# Patient Record
Sex: MALE | Race: WHITE | Employment: OTHER | URBAN - METROPOLITAN AREA
[De-identification: names, ages, dates, MRNs, and addresses within clinical notes are randomized per-mention and may not be internally consistent; named-entity substitution may affect disease eponyms.]

---

## 2019-05-10 ENCOUNTER — OPTICAL OFFICE (OUTPATIENT)
Dept: URBAN - METROPOLITAN AREA CLINIC 146 | Facility: CLINIC | Age: 54
Setting detail: OPHTHALMOLOGY
End: 2019-05-10
Payer: COMMERCIAL

## 2019-05-10 DIAGNOSIS — H52.223: ICD-10-CM

## 2019-05-10 PROCEDURE — V2203 LENS SPHCYL BIFOCAL 4.00D/.1: HCPCS | Performed by: OPHTHALMOLOGY

## 2019-05-10 PROCEDURE — V2745 TINT, ANY COLOR/SOLID/GRAD: HCPCS | Performed by: OPHTHALMOLOGY

## 2019-05-10 PROCEDURE — V2750 ANTI-REFLECTIVE COATING: HCPCS | Performed by: OPHTHALMOLOGY

## 2019-05-10 PROCEDURE — V2020 VISION SVCS FRAMES PURCHASES: HCPCS | Performed by: OPHTHALMOLOGY

## 2019-05-10 PROCEDURE — V2025 EYEGLASSES DELUX FRAMES: HCPCS | Performed by: OPHTHALMOLOGY

## 2019-05-10 PROCEDURE — V2781 PROGRESSIVE LENS PER LENS: HCPCS | Performed by: OPHTHALMOLOGY

## 2019-05-10 PROCEDURE — V2799 MISC VISION ITEM OR SERVICE: HCPCS | Performed by: OPHTHALMOLOGY

## 2019-08-08 ENCOUNTER — OFFICE VISIT (OUTPATIENT)
Dept: URGENT CARE | Facility: CLINIC | Age: 54
End: 2019-08-08
Payer: COMMERCIAL

## 2019-08-08 VITALS
RESPIRATION RATE: 18 BRPM | HEIGHT: 66 IN | HEART RATE: 67 BPM | TEMPERATURE: 97.4 F | OXYGEN SATURATION: 99 % | DIASTOLIC BLOOD PRESSURE: 61 MMHG | BODY MASS INDEX: 22.02 KG/M2 | SYSTOLIC BLOOD PRESSURE: 107 MMHG | WEIGHT: 137 LBS

## 2019-08-08 DIAGNOSIS — L25.5 DERMATITIS DUE TO PLANTS, INCLUDING POISON IVY, SUMAC, AND OAK: Primary | ICD-10-CM

## 2019-08-08 PROCEDURE — 99213 OFFICE O/P EST LOW 20 MIN: CPT | Performed by: PHYSICIAN ASSISTANT

## 2019-08-08 RX ORDER — HYDROXYZINE HYDROCHLORIDE 25 MG/1
25 TABLET, FILM COATED ORAL EVERY 6 HOURS PRN
Qty: 30 TABLET | Refills: 0 | Status: SHIPPED | OUTPATIENT
Start: 2019-08-08

## 2019-08-08 RX ORDER — PREDNISONE 50 MG/1
50 TABLET ORAL DAILY
Qty: 5 TABLET | Refills: 0 | Status: SHIPPED | OUTPATIENT
Start: 2019-08-08 | End: 2019-08-13

## 2019-08-08 NOTE — PROGRESS NOTES
[address]    NAME: Henny Fernandez is a 53 y o  male  : 1965    MRN: [id number]  DATE: 2019  TIME: 3:48 PM    Assessment and Plan   Dermatitis due to plants, including poison ivy, sumac, and oak [L25 5]  1  Dermatitis due to plants, including poison ivy, sumac, and oak  predniSONE 50 mg tablet    hydrOXYzine HCL (ATARAX) 25 mg tablet       Patient Instructions   Allergic dermatitis due to poison ivy exposure  Prescription prednisone 50mg daily x 5 days  Recommend claritin/zyrtec twice daily x 1 week  Prescription atarax sent via EMR if claritin/zyrtec not working  [place], clothing and pets  Follow up with PCP in 3-5 days  Proceed to  ER if symptoms worsen  Chief Complaint     Chief Complaint   Patient presents with   [address]     started a few days ago on arms, hands, thighs  itchy  starting to weep  History of Present Illness     Star Mccoy is a 54 y/o male who presents to the clinic c/o pruritic rash on B/L wrists and upper thigh  He states that he was weeding and pulling emeterio 1 week ago when the rash started  He also notes that he gets rash from poison ivy annually  He has had enough, though and now hired  to weed  He laundered all clothes but not sheets and did not give dog a bath yet  Itching is keeping him up at night and is not improving  He is taking benadryl and OTC cortisone cream with little to no relief  Only mild weeping from lesions on wrists  He denies any SOB or respiratory involvement  Review of Systems   Review of Systems   Constitutional: Negative  Respiratory: Negative for shortness of breath  Skin: Positive for rash           Current Medications       Current Outpatient Medications:     hydrOXYzine HCL (ATARAX) 25 mg tablet, Take 1 tablet (25 mg total) by mouth every 6 (six) hours as needed for itching, Disp: 30 tablet, Rfl: 0    predniSONE 50 mg tablet, Take 1 tablet (50 mg total) by mouth daily for 5 days, Disp: 5 tablet, Rfl: 0    Current Allergies     Allergies as of 08/08/2019    (No Known Allergies)            The following portions of the patient's history were reviewed and updated as appropriate: allergies, current medications, past family history, past medical history, past social history, past surgical history and problem list      History reviewed  No pertinent past medical history  History reviewed  No pertinent surgical history  Family History   Problem Relation Age of Onset    Hypertension Mother     Diverticulitis Mother          Medications have been verified  Objective   /61   Pulse 67   Temp (!) 97 4 °F (36 3 °C)   Resp 18   Ht 5' 6" (1 676 m)   Wt 62 1 kg (137 lb)   SpO2 99%   BMI 22 11 kg/m²        Physical Exam     Physical Exam   Constitutional: He is oriented to person, place, and time  He appears well-developed and well-nourished  No distress  Neurological: He is alert and oriented to person, place, and time  Skin: Rash noted  Rash is vesicular  Erythematous vesicular rash on medial/dorsal aspect of B/L wrists  Minimal excoriation  No signs of infection, bleeding or weeping   Psychiatric: He has a normal mood and affect  Nursing note and vitals reviewed

## 2019-08-08 NOTE — PATIENT INSTRUCTIONS
Allergic dermatitis due to poison ivy exposure  Prescription prednisone 50mg daily x 5 days  Recommend claritin/zyrtec twice daily x 1 week  Prescription atarax sent via EMR if claritin/zyrtec not working  [place], clothing and pets  Follow up with PCP in 3-5 days  Proceed to  ER if symptoms worsen  Poison Ivy   WHAT YOU NEED TO KNOW:   Poison ivy is a plant that can cause an itchy, uncomfortable rash on your skin  Poison ivy grows as a shrub or vine in woods, fields, and areas of thick [place]  It has 3 bright green leaves on each stem that turn red in jeff  DISCHARGE INSTRUCTIONS:   Medicines:   · Antiseptic or drying creams or ointments: These medicines may be used to dry out the rash and decrease the itching  These products may be available without a doctor's order  · Steroids: This medicine helps decrease itching and inflammation  It can be given as a cream to apply to your skin or as a pill  · Antihistamines: This medicine may help decrease itching and help you sleep  It is available without a doctor's order  · Take your medicine as directed  Contact your healthcare provider if you think your medicine is not helping or if you have side effects  Tell him of her if you are allergic to any medicine  Keep a list of the medicines, vitamins, and herbs you take  Include the amounts, and when and why you take them  Bring the list or the pill bottles to follow-up visits  Carry your medicine list with you in case of an emergency  Follow up with your healthcare provider as directed:  Write down your questions so you remember to ask them during your visits  How your poison ivy rash spreads: You cannot spread poison ivy by touching your rash or the liquid from your blisters  Poison ivy is spread only if you scratch your skin while it still has oil on it  You may think your rash is spreading because new rashes appear over a number of days   This happens because areas covered by thin skin break out in a rash first  Your face or forearms may develop a rash before thicker areas, such as the palms of your hands  Self-care:   · Keep your rash clean and dry:  Wash it with soap and water  Gently pat it dry with a clean towel  · Try not to scratch or rub your rash: This can cause your skin to become infected  · Use a compress on your rash:  Dip a clean washcloth in cool water  Wring it out and place it on your rash  Leave the washcloth on your skin for 15 minutes  Do this at least 3 times per day  · Take a cornstarch or oatmeal bath: If your rash is too large to cover with wet washcloths, take 3 or 4 cornstarch baths daily  Mix 1 pound of cornstarch with a little water to make a paste  Add the paste to a tub full of water and mix well  You may also use colloidal oatmeal in the bath water  Use lukewarm water  Avoid hot water because it may cause your itching to increase  Prevent a poison ivy rash in the future:   · Wear skin protection:  Wear long pants, a long-sleeved shirt, and gloves  Use a skin block lotion to protect your skin from poison ivy oil  You can find this at a drugstore without a prescription  · Wash clothing after possible exposure: If you think you have been near a poison ivy plant, wash the clothes you were wearing separately from other clothes  Rinse the washing machine well after you take the clothes out  Scrub boots and shoes with warm, soapy water  Dry clean items and clothing that you cannot wash in water  Poison ivy oil is sticky and can stay on surfaces for a long time  It can cause a new rash even years later  · Bathe your pet:  Use warm water and shampoo on your pet's fur  This will prevent the spread of oil to your skin, car, and home  Wear long sleeves, long pants, and gloves while washing pets or any items that may have oil on them  · Reduce exposure to poison ivy:  Do not touch plants that look like poison ivy  Keep your yard free of poison ivy  While protecting your skin, remove the plant and the roots  Place them in a plastic bag and seal the bag tightly  · Do not burn poison ivy plants: This can spread the oil through the air  If you breathe the oil into your lungs, you could have swelling and serious breathing problems  Oil that clings to the fire domo can land on your skin and cause a rash  Contact your healthcare provider if:   · You have pus, soft yellow scabs, or tenderness on the rash  · The itching gets worse or keeps you awake at night  · The rash covers more than 1/4 of your skin or spreads to your eyes, mouth, or genital area  · The rash is not better after 2 to 3 weeks  · You have tender, swollen glands on the sides of your neck  · You have swelling in your arms and legs  · You have questions or concerns about your condition or care  Return to the emergency department if:   · You have a fever  · You have redness, swelling, and tenderness around the rash  · You have trouble breathing  © 2017 [facility] Information is for End User's use only and may not be sold, redistributed or otherwise used for commercial purposes  All illustrations and images included in CareNotes® are the copyrighted property of A D [employer] M [employer]  or [employer]  The above information is an  only  It is not intended as medical advice for individual conditions or treatments  Talk to your doctor, nurse or pharmacist before following any medical regimen to see if it is safe and effective for you

## 2019-08-14 ENCOUNTER — OPTICAL OFFICE (OUTPATIENT)
Dept: URBAN - METROPOLITAN AREA CLINIC 146 | Facility: CLINIC | Age: 54
Setting detail: OPHTHALMOLOGY
End: 2019-08-14
Payer: COMMERCIAL

## 2019-08-14 DIAGNOSIS — H52.223: ICD-10-CM

## 2019-08-14 PROCEDURE — V2781 PROGRESSIVE LENS PER LENS: HCPCS | Performed by: OPHTHALMOLOGY

## 2019-08-14 PROCEDURE — V2750 ANTI-REFLECTIVE COATING: HCPCS | Performed by: OPHTHALMOLOGY

## 2019-08-14 PROCEDURE — V2799 MISC VISION ITEM OR SERVICE: HCPCS | Performed by: OPHTHALMOLOGY

## 2019-08-14 PROCEDURE — V2203 LENS SPHCYL BIFOCAL 4.00D/.1: HCPCS | Performed by: OPHTHALMOLOGY

## 2019-08-14 PROCEDURE — V2744 TINT PHOTOCHROMATIC LENS/ES: HCPCS | Performed by: OPHTHALMOLOGY

## 2019-08-14 PROCEDURE — V2020 VISION SVCS FRAMES PURCHASES: HCPCS | Performed by: OPHTHALMOLOGY

## 2019-08-26 ENCOUNTER — OPTICAL OFFICE (OUTPATIENT)
Dept: URBAN - METROPOLITAN AREA CLINIC 146 | Facility: CLINIC | Age: 54
Setting detail: OPHTHALMOLOGY
End: 2019-08-26

## 2019-08-26 DIAGNOSIS — H52.7: ICD-10-CM

## 2019-08-26 PROCEDURE — V2799 MISC VISION ITEM OR SERVICE: HCPCS | Performed by: OPHTHALMOLOGY

## 2020-06-04 ENCOUNTER — DOCTOR'S OFFICE (OUTPATIENT)
Dept: URBAN - METROPOLITAN AREA CLINIC 137 | Facility: CLINIC | Age: 55
Setting detail: OPHTHALMOLOGY
End: 2020-06-04
Payer: COMMERCIAL

## 2020-06-04 ENCOUNTER — OPTICAL OFFICE (OUTPATIENT)
Dept: URBAN - METROPOLITAN AREA CLINIC 146 | Facility: CLINIC | Age: 55
Setting detail: OPHTHALMOLOGY
End: 2020-06-04
Payer: COMMERCIAL

## 2020-06-04 DIAGNOSIS — H52.03: ICD-10-CM

## 2020-06-04 DIAGNOSIS — H52.223: ICD-10-CM

## 2020-06-04 DIAGNOSIS — H52.4: ICD-10-CM

## 2020-06-04 PROCEDURE — V2020 VISION SVCS FRAMES PURCHASES: HCPCS | Performed by: OPTOMETRIST

## 2020-06-04 PROCEDURE — V2750 ANTI-REFLECTIVE COATING: HCPCS | Performed by: OPTOMETRIST

## 2020-06-04 PROCEDURE — V2745 TINT, ANY COLOR/SOLID/GRAD: HCPCS | Performed by: OPTOMETRIST

## 2020-06-04 PROCEDURE — V2784 LENS POLYCARB OR EQUAL: HCPCS | Performed by: OPTOMETRIST

## 2020-06-04 PROCEDURE — V2781 PROGRESSIVE LENS PER LENS: HCPCS | Performed by: OPTOMETRIST

## 2020-06-04 PROCEDURE — V2203 LENS SPHCYL BIFOCAL 4.00D/.1: HCPCS | Performed by: OPTOMETRIST

## 2020-06-04 PROCEDURE — 92002 INTRM OPH EXAM NEW PATIENT: CPT | Performed by: OPTOMETRIST

## 2020-06-04 PROCEDURE — V2025 EYEGLASSES DELUX FRAMES: HCPCS | Performed by: OPTOMETRIST

## 2020-06-04 PROCEDURE — V2799 MISC VISION ITEM OR SERVICE: HCPCS | Performed by: OPTOMETRIST

## 2020-06-04 ASSESSMENT — VISUAL ACUITY
OS_BCVA: 20/100
OD_BCVA: 20/25-1

## 2020-06-04 ASSESSMENT — REFRACTION_MANIFEST
OD_ADD: +2.00
OD_CYLINDER: -2.00
OS_SPHERE: +0.50
OS_VA1: 20/20
OS_CYLINDER: -0.50
OS_ADD: +2.00
OS_AXIS: 75
OD_SPHERE: +1.25
OD_AXIS: 80
OD_VA1: 20/20

## 2020-06-04 ASSESSMENT — SPHEQUIV_DERIVED
OD_SPHEQUIV: 0.25
OS_SPHEQUIV: 0.25

## 2020-06-04 ASSESSMENT — CONFRONTATIONAL VISUAL FIELD TEST (CVF)
OS_FINDINGS: FULL
OD_FINDINGS: FULL

## 2023-03-24 ENCOUNTER — OFFICE VISIT (OUTPATIENT)
Dept: URGENT CARE | Facility: CLINIC | Age: 58
End: 2023-03-24

## 2023-03-24 VITALS
HEIGHT: 66 IN | RESPIRATION RATE: 16 BRPM | TEMPERATURE: 97.8 F | HEART RATE: 82 BPM | BODY MASS INDEX: 23.3 KG/M2 | SYSTOLIC BLOOD PRESSURE: 113 MMHG | DIASTOLIC BLOOD PRESSURE: 82 MMHG | WEIGHT: 145 LBS

## 2023-03-24 DIAGNOSIS — S67.196A CRUSHING INJURY OF RIGHT LITTLE FINGER, INITIAL ENCOUNTER: Primary | ICD-10-CM

## 2023-03-24 NOTE — PROGRESS NOTES
[address]        NAME: Luz Ding is a 61 y o  male  : 1965    MRN: [id number]  DATE: 2023  TIME: 11:34 AM    Assessment and Plan   Crushing injury of right little finger, initial encounter [M22 953P]  1  Crushing injury of right little finger, initial encounter          Will opt not to remove the blood blister to decrease the risk of infection  Phalanx fracture unlikely  Advised to monitor for resolution  Patient Instructions     Follow up with PCP in 3-5 days  Proceed to  ER if symptoms worsen  Chief Complaint     Chief Complaint   Patient presents with   • Hand Pain     Patient yesterday hit himself with a hammer on his RT pinky finger  History of Present Illness     65-year-old right-hand-dominant male presents today due to a right pinky finger injury sustained yesterday  Was hammering something with the left hand when he accidentally hit the right pinky finger pinching the ulnar aspect of it  Presents today with a small blood blister on the palmar aspect of the distal phalanx  Denies any pain and has full active ROM  Review of Systems   Review of Systems   Constitutional: Negative for chills and fever  Respiratory: Negative for shortness of breath  Musculoskeletal: Negative for arthralgias  Skin: Positive for wound  Negative for color change and rash  Neurological: Negative for dizziness       Current Medications       Current Outpatient Medications:   •  ascorbic acid (VITAMIN C) 1000 MG tablet, Take 1,000 mg by mouth, Disp: , Rfl:   •  eletriptan (RELPAX) 40 MG tablet, Take 40 mg by mouth, Disp: , Rfl:   •  hydrOXYzine HCL (ATARAX) 25 mg tablet, Take 1 tablet (25 mg total) by mouth every 6 (six) hours as needed for itching, Disp: 30 tablet, Rfl: 0  •  Omega-3 Fatty Acids (Fish Oil) 1000 MG CPDR, Take by mouth, Disp: , Rfl:   •  therapeutic multivitamin-minerals (THERAGRAN-M) tablet, Take 1 tablet by mouth daily, Disp: , Rfl:   •  VITAMIN D, CHOLECALCIFEROL, PO, Take by mouth, Disp: , Rfl:     Current Allergies     Allergies as of 03/24/2023   • (No Known Allergies)            The following portions of the patient's history were reviewed and updated as appropriate: allergies, current medications, past family history, past medical history, past social history, past surgical history and problem list      History reviewed  No pertinent past medical history  History reviewed  No pertinent surgical history  Family History   Problem Relation Age of Onset   • Hypertension Mother    • Diverticulitis Mother          Medications have been verified  Objective   /82   Pulse 82   Temp 97 8 °F (36 6 °C)   Resp 16   Ht 5' 6" (1 676 m)   Wt 65 8 kg (145 lb)   BMI 23 40 kg/m²   No LMP for male patient  Physical Exam     Physical Exam  Vitals and nursing note reviewed  Constitutional:       General: He is not in acute distress  Appearance: Normal appearance  He is normal weight  He is not ill-appearing, toxic-appearing or diaphoretic  HENT:      Head: Normocephalic and atraumatic  Eyes:      General:         Right eye: No discharge  Left eye: No discharge  Conjunctiva/sclera: Conjunctivae normal    Pulmonary:      Effort: Pulmonary effort is normal    Skin:     General: Skin is warm  Findings: Lesion (Tiny abrasion on the ulnar edge of the pinky nail fold  Subcentimeter blood blister on the volar distal phalanx ) present  No erythema  Neurological:      General: No focal deficit present  Mental Status: He is alert and oriented to person, place, and time  Psychiatric:         Mood and Affect: Mood normal          Behavior: Behavior normal          Thought Content:  Thought content normal          Judgment: Judgment normal